# Patient Record
Sex: FEMALE | Race: WHITE
[De-identification: names, ages, dates, MRNs, and addresses within clinical notes are randomized per-mention and may not be internally consistent; named-entity substitution may affect disease eponyms.]

---

## 2021-12-06 ENCOUNTER — HOSPITAL ENCOUNTER (EMERGENCY)
Dept: HOSPITAL 56 - MW.ED | Age: 26
Discharge: HOME | End: 2021-12-06
Payer: SELF-PAY

## 2021-12-06 DIAGNOSIS — Z20.822: ICD-10-CM

## 2021-12-06 DIAGNOSIS — G43.909: Primary | ICD-10-CM

## 2021-12-06 LAB
FLUAV RNA UPPER RESP QL NAA+PROBE: NEGATIVE
FLUBV RNA UPPER RESP QL NAA+PROBE: NEGATIVE
SARS-COV-2 RNA RESP QL NAA+PROBE: NEGATIVE

## 2021-12-06 PROCEDURE — 70450 CT HEAD/BRAIN W/O DYE: CPT

## 2021-12-06 PROCEDURE — 99284 EMERGENCY DEPT VISIT MOD MDM: CPT

## 2021-12-06 PROCEDURE — 36415 COLL VENOUS BLD VENIPUNCTURE: CPT

## 2021-12-06 PROCEDURE — 96374 THER/PROPH/DIAG INJ IV PUSH: CPT

## 2021-12-06 PROCEDURE — 96375 TX/PRO/DX INJ NEW DRUG ADDON: CPT

## 2021-12-06 PROCEDURE — 84703 CHORIONIC GONADOTROPIN ASSAY: CPT

## 2021-12-06 PROCEDURE — 0240U: CPT

## 2021-12-06 NOTE — CT
INDICATION:



Severe headache onset 2 hours ago.



TECHNIQUE:



Multiple axial images were obtained through the brain without contrast. 

Sagittal and coronal re-formatted images were obtained.



COMPARISON:



None.



FINDINGS:



The ventricles and sulci are within normal limits. There is no mass effect 

or midline shift. There is no intracranial hemorrhage. The gray-white 

matter differentiation is unremarkable. There is no fracture seen on bone 

windows.



IMPRESSION:



No acute intracranial abnormality.



Please note that all CT scans at this facility use dose modulation, 

iterative reconstruction, and/or weight-based dosing when appropriate to 

reduce radiation dose to as low as reasonably achievable.



Dictated by Bebeto Ozuna MD @ 12/6/2021 3:36:35 AM



(Electronically Signed)

## 2021-12-06 NOTE — EDM.PDOC
ED HPI GENERAL MEDICAL PROBLEM





- General


Chief Complaint: Headache


Stated Complaint: MIGRAINE; VOMITING


Time Seen by Provider: 12/06/21 02:17





- History of Present Illness


INITIAL COMMENTS - FREE TEXT/NARRATIVE: 





CHIEF COMPLAINT(S): Headache








HISTORY OF PRESENT ILLNESS: This is a 26-year-old with an without any 

significant past medical history who comes to the emergency department with a 

chief complaint of headache.  The patient states that approximately 2 hours ago 

she woke up with sudden onset severe headache.  She states that she has had one 

episode of vomiting.  She states that the pain is 10 out of 10 and located throu

ghout her head.  She denies any numbness, tingling, or weakness but states that 

she does have some photophobia.  She denies any phonophobia.  She denies any 

head injury or loss of consciousness.  She has not tried anything for pain 

therefore there are no relieving factors.  There was no exacerbating factors.  

Patient states this is never happened before and denies any history of migraine.

 She denies any cough, runny nose, congestion, fever, neck pain, neck stiffness.

 She denies any other symptoms





 


REVIEW OF SYSTEMS: 





Constitutional: Denies fever, chills.


Eyes: Denies eye pain


Ears, Nose, Mouth, & Throat: Denies earache


Cardiovascular: Denies chest pain


Respiratory: Denies shortness of breath


Gastrointestinal: Denies Nausea, vomiting, diarrhea, hematochezia.


Genitourinary: Denies hematuria


Skin:Denies a rash


MSK: Denies joint pain


Neurological: Positive for headache.  Denies blurred vision, double vision, 

numbness, tingling, weakness


Psychiatric: Denies depression








PAST MEDICAL HISTORY: As per history of present illness and as reviewed below 

otherwise noncontributory.





SURGICAL HISTORY: As per history of present illness and as reviewed below 

otherwise noncontributory.





SOCIAL HISTORY: As per history of present illness and as reviewed below 

otherwise noncontributory.





FAMILY HISTORY: As per history of present illness and as reviewed below 

otherwise noncontributory.








EXAMINATION OF ORGAN SYSTEMS/BODY AREAS: 





Constitutional: Blood pressure is 149/103, heart rate 80, respiratory rate 18 

with an oxygen saturation 97% on room air.  Temperature 35.8 


General: Young woman who otherwise does not appear to be in any acute distress


Psychiatric: Appropriate mood and affect.


Eyes: No scleral icterus or conjunctival erythema pupils were 3 mm and reactive 

bilaterally.  Extraocular movements intact.  No vertical or horizontal 

nystagmus.


ENMT: Moist mucous membranes. No pharyngeal erythema


Cardiovascular: Regular, rate, and rhythm.  No gallops, murmurs, or rubs.  

Bilateral upper extremity pulses symmetric and intact.  No peripheral edema.  No

JVD.


Respiratory: Lungs clear to auscultation bilaterally.  No wheezes, rales, or 

rhonchi.


Gastrointestinal: Soft, non-tender, non-distended.  Normoactive bowel sounds


Genitourinary: No suprapubic tenderness


Musculoskeletal: Normal range of motion.


Skin: No lesions or abrasions.


Neurological:     AOx4. CN grossly intact. Strength 5/5 in bilateral upper and 

lower extremity. Sensation is intact bilaterally in upper and lower extremity. 

Gait appears normal. 








MEDICAL DECISION MAKING AND COURSE IN THE ED WITH INTERPRETATION/REVIEW OF 

DIAGNOSTIC STUDIES: This is a 26-year-old woman without any significant past 

medical history who comes to the emergency department with the worst headache of

her life who is experiencing vomiting who is mildly hypertensive.  At this time 

we will provide the patient with Zofran for nausea relief and provide the 

patient with 1 L of D5 LR.  Will obtain a CT head without contrast as the 

patient's symptoms started approximately 2 hours prior to arrival.  We will 

evaluate for subarachnoid hemorrhage.  If there is no hemorrhage then we will 

provide the patient with pain relief and reevaluate.  I do not believe any other

labs other than pregnancy test are indicated.  Obtain a Covid swab.





DDx: Migraine headache, simple headache, subarachnoid hemorrhage





The radiological images were viewed by myself along with reading the report from

the radiologist.


CT head without contrast does not reveal any acute intracranial abnormality.





After imaging I did provide the patient with Toradol, Compazine and Benadryl.  

At the time of my reevaluation the patient's nausea had significantly improved. 

We will reevaluate.





After period of observation the patient no longer had any pain and was ready to 

go home.  I did discuss strict return precautions with the patient.  She was 

amenable to discharge and had no further questions





DISPOSITION: The patient was discharged home in stable condition. The patient 

will follow up with primary care physician in 3 to 5 days





CONDITION: Fair





PROCEDURES: None





FINAL IMPRESSION(S)/DIAGNOSES: 





1.  Acute migraine headache





 





Atul Stoner M.D.


  ** headache


Pain Score (Numeric/FACES): 10





- Related Data


                                    Allergies











Allergy/AdvReac Type Severity Reaction Status Date / Time


 


No Known Allergies Allergy   Verified 12/06/21 02:14











Home Meds: 


                                    Home Meds





. [No Known Home Meds]  12/06/21 [History]











Past Medical History


HEENT History: Reports: None


Cardiovascular History: Reports: None


Respiratory History: Reports: None


Gastrointestinal History: Reports: None


Genitourinary History: Reports: None


OB/GYN History: Reports: None


Musculoskeletal History: Reports: None


Neurological History: Reports: None


Psychiatric History: Reports: None


Endocrine/Metabolic History: Reports: None


Insulin Pump Model and : None


Hematologic History: Reports: None


Immunologic History: Reports: None


Oncologic (Cancer) History: Reports: None


Dermatologic History: Reports: None





- Infectious Disease History


Infectious Disease History: Reports: None





- Past Surgical History


Head Surgeries/Procedures: Reports: None


Musculoskeletal Surgical History: Reports: None





Social & Family History





- Caffeine Use


Caffeine Use: Reports: None





- Recreational Drug Use


Recreational Drug Use: No





ED ROS GENERAL





- Review of Systems


Review Of Systems: See Below





ED EXAM, GENERAL





- Physical Exam


Exam: See Below





Course





- Vital Signs


Last Recorded V/S: 


                                Last Vital Signs











Temp  35.8 C L  12/06/21 02:14


 


Pulse  81   12/06/21 05:28


 


Resp  15   12/06/21 05:28


 


BP  128/78   12/06/21 05:28


 


Pulse Ox  100   12/06/21 05:28














- Orders/Labs/Meds


Labs: 


                                Laboratory Tests











  12/06/21 12/06/21 Range/Units





  02:20 02:37 


 


HCG, Qual  NEGATIVE   (NEG)  


 


Influenza Type A RNA   NEGATIVE  (NEGATIVE)  


 


Influenza Type B RNA   NEGATIVE  (NEGATIVE)  


 


SARS-CoV-2 RNA (WILFREDO)   NEGATIVE  (NEGATIVE)  











Meds: 


Medications














Discontinued Medications














Generic Name Dose Route Start Last Admin





  Trade Name Freq  PRN Reason Stop Dose Admin


 


Dexamethasone  10 mg  12/06/21 04:42  12/06/21 05:00





  Dexamethasone 10 Mg/Ml Sdv  IVPUSH  12/06/21 04:43  10 mg





  ONETIME ONE   Administration


 


Diphenhydramine HCl  50 mg  12/06/21 03:42  12/06/21 03:55





  Diphenhydramine 50 Mg/Ml Sdv  IVPUSH  12/06/21 03:43  50 mg





  ONETIME ONE   Administration


 


Dextrose/Lactated Ringer's  1,000 mls @ 999 mls/hr  12/06/21 02:30  12/06/21 02:

27





  Dextrose 5%-Lactated Ringers  IV   999 mls/hr





  ASDIRECTED CLARENCE   Administration


 


Ketorolac Tromethamine  15 mg  12/06/21 03:41  12/06/21 03:53





  Ketorolac 15 Mg/Ml Sdv  IVPUSH  12/06/21 03:42  15 mg





  ONETIME ONE   Administration


 


Ondansetron HCl  4 mg  12/06/21 02:22  12/06/21 02:27





  Ondansetron 4 Mg/2 Ml Sdv  IVPUSH  12/06/21 02:23  4 mg





  ONETIME ONE   Administration


 


Prochlorperazine Edisylate  5 mg  12/06/21 03:41  12/06/21 03:54





  Prochlorperazine 10 Mg/2 Ml Sdv  IVPUSH  12/06/21 03:42  5 mg





  ONETIME ONE   Administration














Departure





- Departure


Time of Disposition: 05:45


Disposition: Home, Self-Care 01


Condition: Fair


Clinical Impression: 


 Migraine








- Discharge Information


*PRESCRIPTION DRUG MONITORING PROGRAM REVIEWED*: No


*COPY OF PRESCRIPTION DRUG MONITORING REPORT IN PATIENT WILLIAM: No


Instructions:  Migraine Headache, Easy-to-Read


Referrals: 


PCP,Not In Area [Primary Care Provider] - 


Forms:  ED Department Discharge


Additional Instructions: 


You were evaluated today on an emergent basis.  At this time your imaging did 

not reveal any bleeding in your head or any abnormality.  We did treat you with 

pain medications here and the emergency department you were able to tolerate 

fluids.  At this time I do believe you are experiencing a migraine headache.  I 

recommend you use Tylenol and Motrin for pain relief.  You are welcome to return

to the emergency department if you have any worsening of your symptoms such as 

worsening headache, vomiting, trouble walking, speaking or swallowing.  

Otherwise please follow-up with primary care physician in 3 to 5 days for 

reevaluation.





[facility]                                              

 


[address] [place] [id number]                                                             

               


Phone: [phone]                                                           

            


Fax: [phone]    





[facility]


[address] [place] [address]                                                             

               


Phone: [phone]                                                           

                                


 Fax: [phone]








The patient is informed of any results of their evaluation and diagnostic workup

and all questions are answered. They are given discharge instructions and return

precautions. The patient is stable for discharge.  The patient states they 

understand and agree with the plan and that they will return if their symptoms 

get worse or if they have any new concerns.





The following information is given to patients seen in the emergency department 

who are being discharged to home. This information is to outline your options 

for follow-up care. We provide all patients seen in our emergency department 

with a follow-up referral.





The need for follow-up, as well as the timing and circumstances, are variable 

depending upon the specifics of your emergency department visit.





If you don't have a primary care physician on staff, we will provide you with a 

referral. We always advise you to contact your personal physician following an 

emergency department visit to inform them of the circumstance of the visit and 

for follow-up with them and/or the need for any referrals to a consulting 

specialist.





The emergency department will also refer you to a specialist when appropriate. 

This referral assures that you have the opportunity for follow-up care with a 

specialist. All of these measure are taken in an effort to provide you with 

optimal care, which includes your follow-up.





Under all circumstances we always encourage you to contact your private 

physician who remains a resource for coordinating your care. When calling for 

follow-up care, please make the office aware that this follow-up is from your 

recent emergency room visit. If for any reason you are refused follow-up, please

contact the [facility] Emergency Department

at [phone] and asked to speak to the emergency department charge nurse.











Sepsis Event Note (ED)





- Evaluation


Sepsis Screening Result: No Definite Risk





- Focused Exam


Vital Signs: 


                                   Vital Signs











  Temp Pulse Resp BP Pulse Ox


 


 12/06/21 05:28   81  15  128/78  100


 


 12/06/21 04:28   83  16  122/72  98


 


 12/06/21 03:55   80  18  128/74  97


 


 12/06/21 02:14  35.8 C L  80  18  149/103 H  97